# Patient Record
Sex: MALE | Race: WHITE | NOT HISPANIC OR LATINO | Employment: FULL TIME | URBAN - METROPOLITAN AREA
[De-identification: names, ages, dates, MRNs, and addresses within clinical notes are randomized per-mention and may not be internally consistent; named-entity substitution may affect disease eponyms.]

---

## 2019-01-09 ENCOUNTER — HOSPITAL ENCOUNTER (EMERGENCY)
Facility: HOSPITAL | Age: 44
Discharge: HOME | End: 2019-01-09
Attending: EMERGENCY MEDICINE | Admitting: EMERGENCY MEDICINE

## 2019-01-09 ENCOUNTER — APPOINTMENT (OUTPATIENT)
Dept: GENERAL RADIOLOGY | Facility: HOSPITAL | Age: 44
End: 2019-01-09

## 2019-01-09 VITALS
RESPIRATION RATE: 16 BRPM | OXYGEN SATURATION: 97 % | TEMPERATURE: 98.5 F | BODY MASS INDEX: 23.75 KG/M2 | SYSTOLIC BLOOD PRESSURE: 140 MMHG | WEIGHT: 195 LBS | DIASTOLIC BLOOD PRESSURE: 91 MMHG | HEIGHT: 76 IN | HEART RATE: 51 BPM

## 2019-01-09 DIAGNOSIS — M25.462 EFFUSION OF BURSA OF LEFT KNEE: Primary | ICD-10-CM

## 2019-01-09 PROCEDURE — 99283 EMERGENCY DEPT VISIT LOW MDM: CPT

## 2019-01-09 PROCEDURE — 73562 X-RAY EXAM OF KNEE 3: CPT

## 2019-01-09 RX ORDER — METHYLPREDNISOLONE 4 MG/1
TABLET ORAL
Qty: 21 EACH | Refills: 0 | Status: SHIPPED | OUTPATIENT
Start: 2019-01-09

## 2019-01-09 RX ORDER — CITALOPRAM 20 MG/1
40 TABLET ORAL DAILY
COMMUNITY

## 2019-01-09 NOTE — ED PROVIDER NOTES
EMERGENCY DEPARTMENT ENCOUNTER    CHIEF COMPLAINT  Chief Complaint: Left knee pain  History given by: Pt  History limited by: none  Room Number: 02/02  PMD: Provider, No Known      HPI:  Pt is a 43 y.o. male who presents complaining of left knee pain that began 3 weeks ago after falling his on his knee cap. Pt states he has been icing his knee and resting it. Pt states the the pain increased today after bumping his left knee on a car door. Pt reports mild relief after taking Ibuprofen earlier today. Pt states he has also redeveloped some swelling in his left knee, in which he had a similar episode of swelling a month ago (without pain) that was drained. Pt states he is frequently on his knees for his job.     Duration:  Three weeks  Onset: gradual  Timing: constant  Location: left knee  Radiation: none  Quality: pain  Intensity/Severity: moderate  Progression: worsening  Associated Symptoms: left knee swelling  Aggravating Factors: none  Alleviating Factors: none  Previous Episodes: none  Treatment before arrival: none    PAST MEDICAL HISTORY  Active Ambulatory Problems     Diagnosis Date Noted   • No Active Ambulatory Problems     Resolved Ambulatory Problems     Diagnosis Date Noted   • No Resolved Ambulatory Problems     Past Medical History:   Diagnosis Date   • Anxiety        PAST SURGICAL HISTORY  Past Surgical History:   Procedure Laterality Date   • LEG WOUND REPAIR / CLOSURE Left    • WRIST SURGERY Left        FAMILY HISTORY  History reviewed. No pertinent family history.    SOCIAL HISTORY  Social History     Socioeconomic History   • Marital status: Single     Spouse name: Not on file   • Number of children: Not on file   • Years of education: Not on file   • Highest education level: Not on file   Social Needs   • Financial resource strain: Not on file   • Food insecurity - worry: Not on file   • Food insecurity - inability: Not on file   • Transportation needs - medical: Not on file   • Transportation  needs - non-medical: Not on file   Occupational History   • Not on file   Tobacco Use   • Smoking status: Current Every Day Smoker     Packs/day: 0.50     Types: Cigarettes   Substance and Sexual Activity   • Alcohol use: No     Frequency: Never   • Drug use: No   • Sexual activity: Not on file   Other Topics Concern   • Not on file   Social History Narrative   • Not on file       ALLERGIES  Patient has no known allergies.    REVIEW OF SYSTEMS  Review of Systems   Constitutional: Negative for activity change, appetite change and fever.   HENT: Negative for congestion and sore throat.    Eyes: Negative.    Respiratory: Negative for cough and shortness of breath.    Cardiovascular: Negative for chest pain and leg swelling.   Gastrointestinal: Negative for abdominal pain, diarrhea and vomiting.   Endocrine: Negative.    Genitourinary: Negative for decreased urine volume and dysuria.   Musculoskeletal: Positive for arthralgias (left knee) and joint swelling (left knee). Negative for neck pain.   Skin: Negative for rash and wound.   Allergic/Immunologic: Negative.    Neurological: Negative for weakness, numbness and headaches.   Hematological: Negative.    Psychiatric/Behavioral: Negative.    All other systems reviewed and are negative.      PHYSICAL EXAM  ED Triage Vitals [01/09/19 1722]   Temp Heart Rate Resp BP SpO2   98.4 °F (36.9 °C) 76 16 -- 97 %      Temp src Heart Rate Source Patient Position BP Location FiO2 (%)   Tympanic -- -- -- --       Physical Exam   Constitutional: No distress.   HENT:   Head: Normocephalic and atraumatic.   Eyes: EOM are normal.   Neck: Normal range of motion.   Cardiovascular: Normal rate and regular rhythm.   Pulmonary/Chest: No respiratory distress.   Abdominal: There is no tenderness.   Musculoskeletal: He exhibits no edema.   Point tenderness to inferior patella where patellar tendon inserts. Swelling but no erythema. No joint tenderness. patellar tendon intact.    Neurological: He  is alert.   NVID in left foot.    Skin: Skin is warm and dry.   Nursing note and vitals reviewed.      RADIOLOGY  XR Knee 3 View Left   Final Result         CLINICAL HISTORY: Patient had bump on the left knee that was drained but  then came back.     FINDINGS: Three radiographic views of the left knee demonstrate no  evidence for acute fracture bony malalignment. There are findings  suggestive of a joint effusion with some increased density noted within  the region of the suprapatellar bursa. No abnormal lytic or sclerotic  lesions identified within the left knee. If further assessment of the  soft tissues is necessary, one could obtain MR imaging for further  assessment. Findings and recommendations were discussed with Dr. Canada  on 01/09/2019 at approximately 6:15 PM.     This report was finalized on 1/9/2019 6:36 PM by Dr. Zia Parisi M.D.    I ordered the above noted radiological studies. Interpreted by radiologist. Discussed with radiologist (). Reviewed by me in PACS.       PROCEDURES  Procedures      Splint Application:  Splint Type: left knee immobilizer  Indication: Pain  Splint placed by ERT  Post splint application:   1) neurovascularly intact   2) good position  Discussed splint care with patient  Discussed PMD/orthopedic follow up      PROGRESS AND CONSULTS        1755  Ordered XR Left knee.     1844  Rechecked pt. Pt is resting comfortably. Notified pt of XR left knee results that are negative for Fx but show joint effusion. Discussed the plan to discharge the pt home with a knee immobilizer and prescriptions for steroids. I instructed the pt to f/u with Ortho. Pt understands and agrees with the plan, all questions answered.      MEDICAL DECISION MAKING  Results were reviewed/discussed with the patient and they were also made aware of online access. Pt also made aware that some labs, such as cultures, will not be resulted during ER visit and follow up with PMD is necessary.     MDM  Number of  Diagnoses or Management Options     Amount and/or Complexity of Data Reviewed  Tests in the radiology section of CPT®: reviewed and ordered ( XR L knee negative for Fx. There is a joint effusion with some increased density noted within the region of the suprapatellar bursa)           DIAGNOSIS  Final diagnoses:   Effusion of bursa of left knee       DISPOSITION  DISCHARGE    Patient discharged in stable condition.    Reviewed implications of results, diagnosis, meds, responsibility to follow up, warning signs and symptoms of possible worsening, potential complications and reasons to return to ER.    Patient/Family voiced understanding of above instructions.    Discussed plan for discharge, as there is no emergent indication for admission. Patient referred to primary care provider for BP management due to today's BP. Pt/family is agreeable and understands need for follow up and repeat testing.  Pt is aware that discharge does not mean that nothing is wrong but it indicates no emergency is present that requires admission and they must continue care with follow-up as given below or physician of their choice.     FOLLOW-UP  Richie Montejo II, MD  [address] [address]  [phone]    In 2 days  If symptoms worsen         Medication List      New Prescriptions    MethylPREDNISolone 4 MG tablet  Commonly known as:  MEDROL (BALTA)  Take as directed on package instructions.              Latest Documented Vital Signs:  As of 11:25 PM  BP- 140/91 HR- 51 Temp- 98.5 °F (36.9 °C) (Oral) O2 sat- 97%    --  Documentation assistance provided by libby Alba for Dr. Canada.  Information recorded by the scribe was done at my direction and has been verified and validated by me.          Kamari Alba  01/09/19 3231       Tom Canada MD  01/09/19 0864

## 2019-01-09 NOTE — ED TRIAGE NOTES
"Pt c/o left knee pain with painful \"lump\" x1 month, states was seen at [facility]  in [facility] approximately one month ago and had fluid aspirated from knee and referred to ortho MD, pt states unable to schedule follow up appointment. Pt states hit left knee on car door today and pain is worse. NAD noted. Skin p/w/d. Swelling and tenderness noted to anterior knee  "

## 2019-01-09 NOTE — ED NOTES
Pt arrves with c/o left knee pain x1-2 weeks. Pt states that he hit the knee on his car door today, causing an increase in pain.     Lolly Cedeno RN  01/09/19 6945

## 2021-01-09 ENCOUNTER — APPOINTMENT (OUTPATIENT)
Dept: GENERAL RADIOLOGY | Facility: HOSPITAL | Age: 46
End: 2021-01-09

## 2021-01-09 ENCOUNTER — HOSPITAL ENCOUNTER (EMERGENCY)
Facility: HOSPITAL | Age: 46
Discharge: HOME | End: 2021-01-09
Attending: EMERGENCY MEDICINE | Admitting: EMERGENCY MEDICINE

## 2021-01-09 VITALS
HEIGHT: 76 IN | DIASTOLIC BLOOD PRESSURE: 80 MMHG | RESPIRATION RATE: 16 BRPM | TEMPERATURE: 97.9 F | WEIGHT: 190 LBS | OXYGEN SATURATION: 98 % | HEART RATE: 66 BPM | BODY MASS INDEX: 23.14 KG/M2 | SYSTOLIC BLOOD PRESSURE: 113 MMHG

## 2021-01-09 DIAGNOSIS — M54.50 ACUTE MIDLINE LOW BACK PAIN WITHOUT SCIATICA: Primary | ICD-10-CM

## 2021-01-09 PROCEDURE — 96372 THER/PROPH/DIAG INJ SC/IM: CPT

## 2021-01-09 PROCEDURE — 72110 X-RAY EXAM L-2 SPINE 4/>VWS: CPT

## 2021-01-09 PROCEDURE — 25010000002 ONDANSETRON PER 1 MG: Performed by: EMERGENCY MEDICINE

## 2021-01-09 PROCEDURE — 25010000002 HYDROMORPHONE 1 MG/ML SOLUTION: Performed by: EMERGENCY MEDICINE

## 2021-01-09 PROCEDURE — 99283 EMERGENCY DEPT VISIT LOW MDM: CPT

## 2021-01-09 RX ORDER — TRAMADOL HYDROCHLORIDE 50 MG/1
50 TABLET ORAL ONCE
Status: COMPLETED | OUTPATIENT
Start: 2021-01-09 | End: 2021-01-09

## 2021-01-09 RX ORDER — ONDANSETRON 2 MG/ML
4 INJECTION INTRAMUSCULAR; INTRAVENOUS ONCE
Status: COMPLETED | OUTPATIENT
Start: 2021-01-09 | End: 2021-01-09

## 2021-01-09 RX ORDER — CYCLOBENZAPRINE HCL 10 MG
10 TABLET ORAL ONCE
Status: COMPLETED | OUTPATIENT
Start: 2021-01-09 | End: 2021-01-09

## 2021-01-09 RX ORDER — CYCLOBENZAPRINE HCL 10 MG
10 TABLET ORAL 3 TIMES DAILY PRN
Qty: 15 TABLET | Refills: 0 | Status: SHIPPED | OUTPATIENT
Start: 2021-01-09

## 2021-01-09 RX ORDER — TRAMADOL HYDROCHLORIDE 50 MG/1
50 TABLET ORAL EVERY 6 HOURS PRN
Qty: 16 TABLET | Refills: 0 | Status: SHIPPED | OUTPATIENT
Start: 2021-01-09

## 2021-01-09 RX ADMIN — TRAMADOL HYDROCHLORIDE 50 MG: 50 TABLET, FILM COATED ORAL at 18:20

## 2021-01-09 RX ADMIN — ONDANSETRON 4 MG: 2 INJECTION INTRAMUSCULAR; INTRAVENOUS at 17:05

## 2021-01-09 RX ADMIN — HYDROMORPHONE HYDROCHLORIDE 2 MG: 1 INJECTION, SOLUTION INTRAMUSCULAR; INTRAVENOUS; SUBCUTANEOUS at 17:05

## 2021-01-09 RX ADMIN — CYCLOBENZAPRINE 10 MG: 10 TABLET, FILM COATED ORAL at 17:09

## 2021-01-09 NOTE — ED NOTES
Pt to ED from home via [facility] d/t back pain. Pt states he was placing a car battery in the floor in his kitchen when he heard a pop in his back with immediate pain. Pt states he has been unable to move from his couch since. Pt rates pain as 8/10 at rest and 10/10 with activity.   Pt placed in mask during triage. This RN wearing proper PPE, mask and glasses, during pt encounter. Hand hygiene performed before and after pt encounter.          Angy Smiley, RN  01/09/21 2539

## 2021-01-10 NOTE — ED PROVIDER NOTES
EMERGENCY DEPARTMENT ENCOUNTER    Room Number:  36/36  Date of encounter:  1/9/2021  PCP: Provider, No Known  Historian: Patient      HPI:  Chief Complaint: Low back pain      Context: Phoenix Whaley is a 45 y.o. male who presents to the ED c/o acute onset of low back pain after lowering a battery to the ground approximately 2 hours ago.  He states he was using his right hand to lower the battery to the ground and felt a pop in his back and he dropped the battery and fell to the ground himself.  The patient denies numbness or tingling in the legs.  He denies weakness in his legs.  He denies saddle paresthesias.  He denies bowel or bladder incontinence.      PAST MEDICAL HISTORY  Active Ambulatory Problems     Diagnosis Date Noted   • No Active Ambulatory Problems     Resolved Ambulatory Problems     Diagnosis Date Noted   • No Resolved Ambulatory Problems     Past Medical History:   Diagnosis Date   • Anxiety          PAST SURGICAL HISTORY  Past Surgical History:   Procedure Laterality Date   • LEG WOUND REPAIR / CLOSURE Left    • WRIST SURGERY Left          FAMILY HISTORY  History reviewed. No pertinent family history.      SOCIAL HISTORY  Social History     Socioeconomic History   • Marital status: Single     Spouse name: Not on file   • Number of children: Not on file   • Years of education: Not on file   • Highest education level: Not on file   Tobacco Use   • Smoking status: Current Every Day Smoker     Packs/day: 0.50     Types: Cigarettes   Substance and Sexual Activity   • Alcohol use: No     Frequency: Never   • Drug use: No         ALLERGIES  Codeine        REVIEW OF SYSTEMS  Review of Systems     Patient denies headache, chest pain, shortness of breath, fevers, chills, cough, known Covid or abdominal pain    All systems reviewed and negative except for those discussed in HPI.     PHYSICAL EXAM    I have reviewed the triage vital signs and nursing notes.    ED Triage Vitals   Temp Heart Rate Resp BP SpO2    01/09/21 1616 01/09/21 1616 01/09/21 1616 01/09/21 1616 01/09/21 1616   97.9 °F (36.6 °C) 58 16 138/79 99 %      Temp src Heart Rate Source Patient Position BP Location FiO2 (%)   01/09/21 1616 01/09/21 1712 -- -- --   Tympanic Monitor          GENERAL: 45-year-old well developed, well nourished in mild distress  HENT: NCAT, neck supple, trachea midline  EYES: no scleral icterus, PERRL, normal conjunctiva  CV: regular rhythm, regular rate, no murmur  RESPIRATORY: unlabored effort, CTAB  ABDOMEN: soft, non-tender, non-distended, bowel sounds present  MUSCULOSKELETAL: no gross deformity, no pedal edema, no calf tenderness  NEURO: alert,  sensory and motor function of extremities intact, speech clear, mental status normal; the patient has positive straight leg raise test bilaterally at 10 degrees.  Patient has good dorsiflexion and plantarflexion.  Patient has good reflexes bilaterally.  SKIN: warm, dry, no rash  PSYCH:  Appropriate mood and affect      PPE  Pt does not present with symptoms for COVID19; however, I was wearing a mask and goggles throughout all patient interaction.    Vital signs and nursing notes reviewed.      LAB RESULTS  No results found for this or any previous visit (from the past 24 hour(s)).    Ordered the above labs and independently reviewed the results.        RADIOLOGY  Xr Spine Lumbar Complete 4+vw    Result Date: 1/9/2021  Lumbar spine radiograph  HISTORY:Pain  TECHNIQUE: AP, lateral, oblique and Spot views of the lumbar spine  COMPARISON:CT abdomen and pelvis 07/29/2006      FINDINGS AND IMPRESSION: No lumbar spine fracture or malalignment is visualized. Mild degenerative changes are present within the lower lumbar spine and can be further evaluated with MRI if clinically indicated.  This report was finalized on 1/9/2021 5:34 PM by Dr. Bryce Lamas M.D.        I ordered the above noted radiological studies. Independently reviewed by me and discussed with radiologist.  See dictation  above for official radiology interpretation.      PROCEDURES    Procedures        MEDICATIONS GIVEN IN ER    Medications   HYDROmorphone (DILAUDID) injection 2 mg (2 mg Intramuscular Given 1/9/21 1705)   ondansetron (ZOFRAN) injection 4 mg (4 mg Intramuscular Given 1/9/21 1705)   cyclobenzaprine (FLEXERIL) tablet 10 mg (10 mg Oral Given 1/9/21 1709)   traMADol (ULTRAM) tablet 50 mg (50 mg Oral Given 1/9/21 1820)         PROGRESS, DATA ANALYSIS, CONSULTS, AND MEDICAL DECISION MAKING    All labs have been independently reviewed by me.  All radiology studies have been reviewed by me and discussed with radiologist dictating report.   EKG's independently reviewed by me.  Discussion below represents my analysis of pertinent findings related to patient's condition, differential diagnosis, treatment plan and final disposition.      ED Course as of Jan 09 1914   Sat Jan 09, 2021   1635 With acute injury I will check an L-spine series and provide him with pain control.    [GP]   1746 Patient now feeling better and able to roll onto his side.  Advised him his x-rays are negative and I will treat him with pain control muscle relaxers.  Advised him to bed rest for the next 24 to 48 hours and return if worse.  The patient understands and agrees the plan.    [GP]      ED Course User Index  [GP] Tom Canada MD             AS OF 19:14 EST VITALS:    BP - 113/80  HR - 66  TEMP - 97.9 °F (36.6 °C) (Tympanic)  02 SATS - 98%        DIAGNOSIS  Final diagnoses:   Acute midline low back pain without sciatica         DISPOSITION  Discharged        EMR Dragon/Transcription disclaimer:   Much of this encounter note is an electronic transcription/translation of spoken language to printed text.       No scribe was used     Tom Canada MD  01/09/21 1916